# Patient Record
Sex: FEMALE | Race: OTHER | Employment: OTHER | URBAN - METROPOLITAN AREA
[De-identification: names, ages, dates, MRNs, and addresses within clinical notes are randomized per-mention and may not be internally consistent; named-entity substitution may affect disease eponyms.]

---

## 2020-08-23 ENCOUNTER — HOSPITAL ENCOUNTER (EMERGENCY)
Facility: HOSPITAL | Age: 70
Discharge: HOME | End: 2020-08-23
Attending: EMERGENCY MEDICINE
Payer: MEDICARE

## 2020-08-23 VITALS
TEMPERATURE: 98 F | WEIGHT: 102 LBS | HEIGHT: 59 IN | RESPIRATION RATE: 17 BRPM | BODY MASS INDEX: 20.56 KG/M2 | DIASTOLIC BLOOD PRESSURE: 55 MMHG | SYSTOLIC BLOOD PRESSURE: 123 MMHG | HEART RATE: 84 BPM | OXYGEN SATURATION: 98 %

## 2020-08-23 DIAGNOSIS — R04.0 EPISTAXIS: Primary | ICD-10-CM

## 2020-08-23 LAB
BASOPHILS # BLD AUTO: 0.03 X10(3) UL (ref 0–0.2)
BASOPHILS NFR BLD AUTO: 0.2 %
DEPRECATED RDW RBC AUTO: 43.4 FL (ref 35.1–46.3)
EOSINOPHIL # BLD AUTO: 0.05 X10(3) UL (ref 0–0.7)
EOSINOPHIL NFR BLD AUTO: 0.4 %
ERYTHROCYTE [DISTWIDTH] IN BLOOD BY AUTOMATED COUNT: 13.2 % (ref 11–15)
HCT VFR BLD AUTO: 39.1 % (ref 35–48)
HGB BLD-MCNC: 13.1 G/DL (ref 12–16)
IMM GRANULOCYTES # BLD AUTO: 0.06 X10(3) UL (ref 0–1)
IMM GRANULOCYTES NFR BLD: 0.5 %
INR BLD: 1.04 (ref 0.89–1.11)
LYMPHOCYTES # BLD AUTO: 1 X10(3) UL (ref 1–4)
LYMPHOCYTES NFR BLD AUTO: 8.1 %
MCH RBC QN AUTO: 30.1 PG (ref 26–34)
MCHC RBC AUTO-ENTMCNC: 33.5 G/DL (ref 31–37)
MCV RBC AUTO: 89.9 FL (ref 80–100)
MONOCYTES # BLD AUTO: 0.72 X10(3) UL (ref 0.1–1)
MONOCYTES NFR BLD AUTO: 5.8 %
NEUTROPHILS # BLD AUTO: 10.52 X10 (3) UL (ref 1.5–7.7)
NEUTROPHILS # BLD AUTO: 10.52 X10(3) UL (ref 1.5–7.7)
NEUTROPHILS NFR BLD AUTO: 85 %
PLATELET # BLD AUTO: 316 10(3)UL (ref 150–450)
PSA SERPL DL<=0.01 NG/ML-MCNC: 13.9 SECONDS (ref 12.4–14.6)
RBC # BLD AUTO: 4.35 X10(6)UL (ref 3.8–5.3)
WBC # BLD AUTO: 12.4 X10(3) UL (ref 4–11)

## 2020-08-23 PROCEDURE — 85610 PROTHROMBIN TIME: CPT | Performed by: EMERGENCY MEDICINE

## 2020-08-23 PROCEDURE — 99283 EMERGENCY DEPT VISIT LOW MDM: CPT

## 2020-08-23 PROCEDURE — 30901 CONTROL OF NOSEBLEED: CPT

## 2020-08-23 PROCEDURE — 36415 COLL VENOUS BLD VENIPUNCTURE: CPT

## 2020-08-23 PROCEDURE — 85025 COMPLETE CBC W/AUTO DIFF WBC: CPT | Performed by: EMERGENCY MEDICINE

## 2020-08-23 RX ORDER — TRANEXAMIC ACID 100 MG/ML
5 INJECTION, SOLUTION INTRAVENOUS ONCE
Status: DISCONTINUED | OUTPATIENT
Start: 2020-08-23 | End: 2020-08-23

## 2020-08-23 NOTE — ED PROVIDER NOTES
Patient Seen in: [facility] Emergency Department      History   Patient presents with:  Nose Bleed    Stated Complaint: Nose bleed, no rhino rockets at immediate care.   administered spray of zach-sy*    HPI    41-year-old with a history of alcohol Pulse 84   Temp 97.5 °F (36.4 °C) (Temporal)   Resp 17   Ht 149.9 cm (4' 11\")   Wt 46.3 kg   SpO2 98%   BMI 20.60 kg/m²         Physical Exam    General: Patient is awake alert no acute distress  HEENT: Conjunctival within normal limits.   Scant amount of 12:36 pm    Follow-up:  Carrie Hamilton MD  [address]    Schedule an appointment as soon as possible for a visit in 1 week            Medications Prescribed:  There are no discharge medications for this patie

## 2020-08-23 NOTE — ED INITIAL ASSESSMENT (HPI)
Nose bleed, no rhino rockets at immediate care. Dr administered spray of zach-synephrine. Not on blood thinners.